# Patient Record
Sex: MALE | Race: WHITE | NOT HISPANIC OR LATINO | URBAN - METROPOLITAN AREA
[De-identification: names, ages, dates, MRNs, and addresses within clinical notes are randomized per-mention and may not be internally consistent; named-entity substitution may affect disease eponyms.]

---

## 2025-05-28 ENCOUNTER — HOSPITAL ENCOUNTER (OUTPATIENT)
Dept: RADIOLOGY | Facility: HOSPITAL | Age: 20
Discharge: HOME | End: 2025-05-28
Payer: COMMERCIAL

## 2025-05-28 ENCOUNTER — OFFICE VISIT (OUTPATIENT)
Dept: ORTHOPEDIC SURGERY | Facility: HOSPITAL | Age: 20
End: 2025-05-28
Payer: COMMERCIAL

## 2025-05-28 VITALS — HEIGHT: 70 IN | WEIGHT: 190 LBS | BODY MASS INDEX: 27.2 KG/M2

## 2025-05-28 DIAGNOSIS — M25.512 ACUTE PAIN OF LEFT SHOULDER: ICD-10-CM

## 2025-05-28 DIAGNOSIS — S43.102A AC SEPARATION, LEFT, INITIAL ENCOUNTER: ICD-10-CM

## 2025-05-28 PROCEDURE — 99213 OFFICE O/P EST LOW 20 MIN: CPT | Performed by: PHYSICIAN ASSISTANT

## 2025-05-28 PROCEDURE — 73030 X-RAY EXAM OF SHOULDER: CPT | Mod: LEFT SIDE | Performed by: RADIOLOGY

## 2025-05-28 PROCEDURE — 99203 OFFICE O/P NEW LOW 30 MIN: CPT | Performed by: PHYSICIAN ASSISTANT

## 2025-05-28 PROCEDURE — 3008F BODY MASS INDEX DOCD: CPT | Performed by: PHYSICIAN ASSISTANT

## 2025-05-28 PROCEDURE — 73030 X-RAY EXAM OF SHOULDER: CPT | Mod: LT

## 2025-05-28 PROCEDURE — L3670 SO ACRO/CLAV CAN WEB PRE OTS: HCPCS | Performed by: PHYSICIAN ASSISTANT

## 2025-05-28 RX ORDER — MELOXICAM 15 MG/1
15 TABLET ORAL DAILY
Qty: 14 TABLET | Refills: 0 | Status: SHIPPED | OUTPATIENT
Start: 2025-05-28 | End: 2025-06-11

## 2025-05-28 ASSESSMENT — PAIN - FUNCTIONAL ASSESSMENT: PAIN_FUNCTIONAL_ASSESSMENT: 0-10

## 2025-05-28 ASSESSMENT — PAIN DESCRIPTION - DESCRIPTORS: DESCRIPTORS: SHARP;SHOOTING;PRESSURE

## 2025-05-28 ASSESSMENT — PAIN SCALES - GENERAL: PAINLEVEL_OUTOF10: 8

## 2025-05-28 NOTE — PROGRESS NOTES
HPI  This is a pleasant 19 y.o. male here today at the Orthopedic injury clinic for further evaluation of left shoulder pain.  He was wrestling with a friend and landed directly only the left shoulder.  He has pain localized to the top of the shoulder. Worsened with mvmt.  No radicular pain.  He is LHD.      Medical History[1]    Surgical History[2]    Social History[3]        ROS  Reviewed and all pertinent positives mentioned in HPI.      EXAM  Gen:  Patient is in no acute distress.    HEENT: NCAT, EOMI  RESP: unlabored breathing  Skin:  warm, dry, no open wounds  Neur: They are alert and oriented x3.    Psych: They are of normal mood and affect.  They are in no acute distress.      Musc: TTP over the left AC joint.  No tenting of the skin.  Pain with minimal ROM but he was able keep arm elevated against resistance.          IMAGING  Imaging reviewed today reveal no gross fracture or dislocation.  Elevation of the clavicle noted consistent with AC joint separation.      ASSESSMENT/PLAN  Left shoulder separation grade II.  Sling for comfort.  PT. Follow up in 4 weeks for recheck.    Patient was prescribed a shoulder immobilizer for shoulder separation.  The patient has weakness, instability and/or deformity of their left shoulder which requires stabilization from this orthosis to improve their function.     Verbal and written instructions for the use, wear schedule, cleaning and application of this item were given. Patient was instructed that should the brace result in increased pain, decreased sensation, increased swelling, or an overall worsening of their medical condition, to please contact our office immediately.     Orthotic management and training was provided for skin care, modifications due to healing tissues, edema changes, interruption in skin integrity, and safety precautions with the orthosis.       Kayla Wilcox PA-C         [1] No past medical history on file.  [2] No past surgical history on  file.  [3]

## 2025-07-25 ENCOUNTER — OFFICE VISIT (OUTPATIENT)
Dept: ORTHOPEDIC SURGERY | Facility: HOSPITAL | Age: 20
End: 2025-07-25
Payer: COMMERCIAL

## 2025-07-25 ENCOUNTER — HOSPITAL ENCOUNTER (OUTPATIENT)
Dept: RADIOLOGY | Facility: HOSPITAL | Age: 20
Discharge: HOME | End: 2025-07-25
Payer: COMMERCIAL

## 2025-07-25 DIAGNOSIS — M25.511 RIGHT SHOULDER PAIN, UNSPECIFIED CHRONICITY: ICD-10-CM

## 2025-07-25 DIAGNOSIS — M25.512 LEFT SHOULDER PAIN: ICD-10-CM

## 2025-07-25 DIAGNOSIS — M25.511 RIGHT SHOULDER PAIN, UNSPECIFIED CHRONICITY: Primary | ICD-10-CM

## 2025-07-25 PROCEDURE — 73030 X-RAY EXAM OF SHOULDER: CPT | Mod: LT

## 2025-07-25 PROCEDURE — 99212 OFFICE O/P EST SF 10 MIN: CPT | Performed by: NURSE PRACTITIONER

## 2025-07-25 PROCEDURE — 99214 OFFICE O/P EST MOD 30 MIN: CPT | Performed by: NURSE PRACTITIONER

## 2025-07-25 RX ORDER — ESCITALOPRAM OXALATE 20 MG/1
1 TABLET ORAL
COMMUNITY
Start: 2025-07-21

## 2025-07-25 RX ORDER — LISDEXAMFETAMINE DIMESYLATE 30 MG/1
30 CAPSULE ORAL
COMMUNITY
Start: 2025-07-21

## 2025-07-25 RX ORDER — TRIAMCINOLONE ACETONIDE 1 MG/G
CREAM TOPICAL 2 TIMES DAILY
COMMUNITY
Start: 2025-01-22

## 2025-07-25 RX ORDER — DICYCLOMINE HYDROCHLORIDE 20 MG/1
20 TABLET ORAL 2 TIMES DAILY
COMMUNITY
Start: 2025-02-16

## 2025-07-25 RX ORDER — DOXYCYCLINE 100 MG/1
CAPSULE ORAL
COMMUNITY
Start: 2025-07-10

## 2025-07-25 RX ORDER — CLINDAMYCIN PHOSPHATE 10 UG/ML
LOTION TOPICAL DAILY
COMMUNITY
Start: 2025-07-10

## 2025-07-25 RX ORDER — FINASTERIDE 1 MG/1
1 TABLET, FILM COATED ORAL
COMMUNITY
Start: 2025-07-10

## 2025-07-25 RX ORDER — HYDROXYZINE HYDROCHLORIDE 50 MG/1
TABLET, FILM COATED ORAL
COMMUNITY
Start: 2025-02-14

## 2025-07-25 ASSESSMENT — PAIN SCALES - GENERAL: PAINLEVEL_OUTOF10: 2

## 2025-07-25 ASSESSMENT — PAIN - FUNCTIONAL ASSESSMENT: PAIN_FUNCTIONAL_ASSESSMENT: 0-10

## 2025-07-31 ENCOUNTER — APPOINTMENT (OUTPATIENT)
Dept: ORTHOPEDIC SURGERY | Facility: HOSPITAL | Age: 20
End: 2025-07-31
Payer: COMMERCIAL

## 2025-08-04 NOTE — PROGRESS NOTES
Provider Impression/Assessment:  Jerry Hutton is a 19 y.o. male with left AC joint, Type 2 separation, following injury 5/28/25.      Patient Discussion/Plan:  At this point we a long discussion about their options. We discussed the fact that a course of nonoperative management including provider directed home therapy, anti-inflammatories and possible injections in 6-8 weeks if no better would be the best course of action. I reassured him that rest and focusing on scapular stabilizers and shoulder muscles would be the best way to go about this. He was given home exercises with correlating website today.      In general most patients who come in with complaints such as these will get better with home therapy, NSAIDs and injections alone, as needed. The therapy should be performed 2-3 times a day and should take about 10-15 minutes to perform each time. We have given the patient our written provider directed home exercise program. A physical therapy prescription and  physical therapy locations was also offered to the patient today. We will hold off on this currently, as he will be returning to school in the next few weeks.      We will see how they do with conservative management. Ultimately if he gets little or no relief over the next few weeks and fails home therapy, modification of activity and OTC medication, we will revisit a possible AC joint injection with one of our Sports Medicine Colleagues. Possible indication of MR arthrogram of shoulder if injection fails to give consistent pain relief.      Patient can be seen again in clinic as needed, as he will be returning to college in the next few weeks. Happy to see him back anytime as needed. If symptoms improve, they can see us back on an as-needed basis. He is aware to take it easy and to work on range of motion, adding weight as tolerated.  All patient's questions were answered to their satisfaction today and they are comfortable with the above plan.       Should you have any questions or concerns after your visit today, please do not hesitate to call the office at [phone]. We strive to give you high-quality, patient-centered, collaborative care and I am honored to be a part of your health care team.      -------------------------------------------------------------------------------------------------  CHIEF COMPLAINT:  NPV, follow up from injury clinic  Left shoulder, DOI: 5/28/25    History of Present Illness:  Jerry Hutton is a pleasant 19 y.o. left hand dominant male presenting to clinic with left AC joint pain for the last 2 months. He first presented to the injury clinic following an acute injury to his AC joint. Pain first noticed after wrestling with his friend while at school. He has actually hurt this shoulder twice over the last few months. Normal shoulder prior to that time. At this point he has not tried any therapy. He has not had any previous injections. The shoulder has started to feel better now. 85% of a normal shoulder.  0 out of 10 pain on average. 2 out of 10 at their worst. Does not wake them from sleep. The shoulder does not feel unstable. Does not feel stiff. No numbness or tingling. Taking over-the counter medications for pain relief. The opposite shoulder is normal, without complaint. Denies fevers or chills. He has trouble with playing pickle ball. But his range of motion has nicely returned.     Smoking Status: Daily  Non Diabetic   BMI: 27    Past medical history, surgical history, social history and family history were all reviewed and are per the patient's health history.  Family history is not pertinent to the presenting problem.     Allergies:  Allergies[1]    Medical History[2]    Surgical History[3]    Social History     Socioeconomic History    Marital status: Single     Spouse name: Not on file    Number of children: Not on file    Years of education: Not on file    Highest education level: Not on file   Occupational  History    Not on file   Tobacco Use    Smoking status: Never    Smokeless tobacco: Never   Vaping Use    Vaping status: Every Day    Substances: Nicotine, THC   Substance and Sexual Activity    Alcohol use: Yes     Alcohol/week: 1.0 standard drink of alcohol     Types: 1 Standard drinks or equivalent per week    Drug use: Never    Sexual activity: Defer   Other Topics Concern    Not on file   Social History Narrative    Not on file     Social Drivers of Health     Financial Resource Strain: Low Risk  (2/4/2022)    Received from [facility]    Overall Financial Resource Strain (CARDIA)     Difficulty of Paying Living Expenses: Not hard at all   Food Insecurity: No Food Insecurity (2/16/2025)    Received from [facility]    Hunger Screening     Within the past 12 months we worried whether our food would run out before we got money to buy more.: Never True     Within the past 12 months the food we bought just didn't last and we didn't have money to get more.: Never True   Transportation Needs: Unknown (2/4/2022)    Received from [facility]    PRAPARE - Transportation     Lack of Transportation (Medical): Not on file     Lack of Transportation (Non-Medical): No   Physical Activity: Insufficiently Active (2/4/2022)    Received from [facility]    Exercise Vital Sign     On average, how many days per week do you engage in moderate to strenuous exercise (like a brisk walk)?: 3 days     On average, how many minutes do you engage in exercise at this level?: 30 min   Stress: Not on file   Social Connections: Not on file   Intimate Partner Violence: Not on file   Housing Stability: Unknown (2/4/2022)    Received from [facility]    Housing Stability Vital Sign     Unable to Pay for Housing in the Last Year: No     Number of Places Lived in the Last Year: Not on file     Unstable Housing in the Last Year: No       Medications:  Current Medications[4]    Family History:  Family  History[5]    Review of Systems:   Review of systems for all 14 systems is negative for complaint except for the above noted findings in the history of present illness.    Diagnoses/Problems:  Left shoulder, Type 2 AC joint separation    Physical Examination:  Patient is a well-developed, well nourished male in no acute distress. Awake, alert and oriented x3, with appropriate mood. Breathes with normal chest rises. Eye exam reveals round pupils with clear sclera. Gait is steady.    Patient had full range of motion of bilateral elbows. 5 out of 5 bilateral wrist flexion and extension. Thumb extension positive bilaterally.   Examination of the right shoulder today reveals the skin to be intact. There is no sign any atrophy lesions or abrasions. There is no pain to palpation of the bony prominences. Range of motion of the right shoulder revealed 0-170° of forward elevation. 0-60° of external rotation. Internal rotation was to T12. Provocative maneuvers today were negative.    Examination of left shoulder reveals skin is intact. No edema. No ecchymosis. No erythema. There is no tenting of skin over AC joint. Mild tenderness over acromial clavicular joint pain. No tenderness with palpation along the anterior chromium and greater tuberosity. Range of motion is near symmetric to contralateral side, passively correctable. No signs of apprehension. Negative parascapular pain. No signs of any scapular dyskinesia. No evidence of winging. Provocative maneuvers of the rotator cuff are negative.     Active forward elevation to about 160°. External rotation 60°, passively to 90°, without pain. Internally rotates to T12. Abduction to 160°.  No pain along biceps tendon or greater tuberosity. Neurovascular intact distally. Jobes test is 5+ out of 5. External/Internal resistance negative, 5/5.     Results/Imaging:  Independent review of the imaging today of left shoulder shows Type 2 AC joint separation without signs of any fracture,  dislocation or arthritis. I personally spent time viewing digital images of the radiology testing with the patient. I explained the results to the patient, along with suggested explanation for follow up recommendations based on testing and clinical results.       *While intending to generate a timely document that accurately reflects the content of the visit, no guarantee can be provided that every grammatical or spelling mistake has been or will be identified or corrected. Thank you for your understanding.         [1] No Known Allergies  [2] History reviewed. No pertinent past medical history.  [3] History reviewed. No pertinent surgical history.  [4]   Current Outpatient Medications:     clindamycin (Cleocin T) 1 % lotion, Apply topically once daily., Disp: , Rfl:     dicyclomine (Bentyl) 20 mg tablet, Take 1 tablet (20 mg) by mouth twice a day., Disp: , Rfl:     doxycycline (Vibramycin) 100 mg capsule, TAKE ONE CAPSULE BY MOUTH TWO TIMES A DAY WITH FOOD AND WATER, Disp: , Rfl:     escitalopram (Lexapro) 20 mg tablet, Take 1 tablet (20 mg) by mouth early in the morning.., Disp: , Rfl:     finasteride (Propecia) 1 mg tablet, Take 1 tablet (1 mg) by mouth early in the morning.., Disp: , Rfl:     hydrOXYzine HCL (Atarax) 50 mg tablet, TAKE ONE TABLET BY MOUTH EVERY 6 HOURS AS NEEDED FOR UP TO 14 DAYS, Disp: , Rfl:     lisdexamfetamine (Vyvanse) 30 mg capsule, Take 1 capsule (30 mg) by mouth once daily in the morning. Take before meals., Disp: , Rfl:     triamcinolone (Kenalog) 0.1 % cream, Apply topically 2 times a day. to affected area, Disp: , Rfl:   [5] No family history on file.